# Patient Record
Sex: FEMALE | URBAN - METROPOLITAN AREA
[De-identification: names, ages, dates, MRNs, and addresses within clinical notes are randomized per-mention and may not be internally consistent; named-entity substitution may affect disease eponyms.]

---

## 2017-05-20 DIAGNOSIS — F41.9 ANXIETY: ICD-10-CM

## 2017-05-20 NOTE — LETTER
5/22/2017 9:49 AM 
 
Ms. Harrison Mandel [address] [id number] Dear Ms. Michael Grey missed you! Please call our office at [phone] and schedule a follow up appointment for your continued care.  
 
 
 
Sincerely, 
 
 
Julia Che MD

## 2017-05-22 RX ORDER — ALPRAZOLAM 0.25 MG/1
TABLET ORAL
Qty: 90 TAB | Refills: 0 | OUTPATIENT
Start: 2017-05-22

## 2017-05-31 ENCOUNTER — OFFICE VISIT (OUTPATIENT)
Dept: INTERNAL MEDICINE CLINIC | Age: 57
End: 2017-05-31

## 2017-05-31 VITALS
BODY MASS INDEX: 34.78 KG/M2 | WEIGHT: 189 LBS | RESPIRATION RATE: 16 BRPM | DIASTOLIC BLOOD PRESSURE: 87 MMHG | TEMPERATURE: 99.2 F | SYSTOLIC BLOOD PRESSURE: 140 MMHG | HEART RATE: 89 BPM | HEIGHT: 62 IN

## 2017-05-31 DIAGNOSIS — F33.41 RECURRENT MAJOR DEPRESSIVE DISORDER, IN PARTIAL REMISSION (HCC): ICD-10-CM

## 2017-05-31 DIAGNOSIS — Z72.0 TOBACCO ABUSE: ICD-10-CM

## 2017-05-31 DIAGNOSIS — Z00.00 GENERAL MEDICAL EXAM: ICD-10-CM

## 2017-05-31 DIAGNOSIS — F41.9 ANXIETY: Primary | ICD-10-CM

## 2017-05-31 DIAGNOSIS — E03.1 CONGENITAL HYPOTHYROIDISM WITHOUT GOITER: ICD-10-CM

## 2017-05-31 RX ORDER — PAROXETINE HYDROCHLORIDE 20 MG/1
20 TABLET, FILM COATED ORAL DAILY
Qty: 30 TAB | Refills: 5 | Status: SHIPPED | OUTPATIENT
Start: 2017-05-31 | End: 2017-11-29 | Stop reason: SDUPTHER

## 2017-05-31 RX ORDER — ALPRAZOLAM 0.25 MG/1
0.25 TABLET ORAL
Qty: 90 TAB | Refills: 2 | Status: SHIPPED | OUTPATIENT
Start: 2017-05-31 | End: 2017-08-24 | Stop reason: SDUPTHER

## 2017-05-31 NOTE — PROGRESS NOTES
Chief Complaint   Patient presents with    Medication Refill    Labs     Seen once for anxiety ongoing and continuous    Patient Active Problem List    Diagnosis    Colonoscopy refused    Tobacco abuse    Thyroid disease    Depression    Anxiety     Still with stress  Some depression    Vitals:    05/31/17 1604 05/31/17 1626   BP: 144/85 140/87   Pulse: 89    Resp: 16    Temp: 99.2 °F (37.3 °C)    TempSrc: Oral    Weight: 189 lb (85.7 kg)    Height: 5' 2\" (1.575 m)      Arthur Kearns was seen today for medication refill and labs. Diagnoses and all orders for this visit:    Anxiety  -     ALPRAZolam (XANAX) 0.25 mg tablet; Take 1 Tab by mouth three (3) times daily as needed for Anxiety. Max Daily Amount: 0.75 mg. Congenital hypothyroidism without goiter  -     METABOLIC PANEL, COMPREHENSIVE  -     TSH 3RD GENERATION    Recurrent major depressive disorder, in partial remission (HCC)  -     PARoxetine (PAXIL) 20 mg tablet; Take 1 Tab by mouth daily.     Tobacco abuse    General medical exam  -     CBC WITH AUTOMATED DIFF  -     LIPID PANEL      See Madina  Try higher dose paxil

## 2017-05-31 NOTE — MR AVS SNAPSHOT
Visit Information Date & Time Provider Department Dept. Phone Encounter #  
 5/31/2017  4:00 PM Klever Leiva MD [facility] [phone] [id number] Upcoming Health Maintenance Date Due Hepatitis C Screening 1960 Pneumococcal 19-64 Medium Risk (1 of 1 - PPSV23) 7/30/1978 DTaP/Tdap/Td series (1 - Tdap) 7/30/1980 PAP AKA CERVICAL CYTOLOGY 7/30/1980 BREAST CANCER SCRN MAMMOGRAM 7/30/2009 FOBT Q 1 YEAR AGE 50-75 7/30/2009 INFLUENZA AGE 9 TO ADULT 8/1/2017 Allergies as of 5/31/2017  Review Complete On: 5/31/2017 By: Padilla Hsieh LPN Severity Noted Reaction Type Reactions Codeine  12/30/2016    Nausea Only Pcn [Penicillins]  12/30/2016    Hives Tetracycline  12/30/2016    Nausea Only Current Immunizations  Never Reviewed No immunizations on file. Not reviewed this visit You Were Diagnosed With   
  
 Codes Comments Tobacco abuse    -  Primary ICD-10-CM: Z72.0 ICD-9-CM: 305.1 Anxiety     ICD-10-CM: F41.9 ICD-9-CM: 300.00 Congenital hypothyroidism without goiter     ICD-10-CM: E03.1 ICD-9-CM: 159 Recurrent major depressive disorder, [facility] ([facility]     ICD-10-CM: F33.41 
ICD-9-CM: 296.35 General medical exam     ICD-10-CM: Z00.00 ICD-9-CM: V70.9 Vitals BP Pulse Temp Resp Height(growth percentile) Weight(growth percentile) 140/87 89 99.2 °F (37.3 °C) (Oral) 16 5' 2\" (1.575 m) 189 lb (85.7 kg) BMI OB Status Smoking Status 34.57 kg/m2 Postmenopausal Current Every Day Smoker Vitals History BMI and BSA Data Body Mass Index Body Surface Area 34.57 kg/m 2 1.94 m 2 Preferred Pharmacy Pharmacy Name Phone [address] AT [address] [place] [address]. [phone] Your Updated Medication List  
  
   
This list is accurate as of: 5/31/17  4:32 PM.  Always use your most recent med list.  
  
  
  
  
 ALPRAZolam 0.25 mg tablet Commonly known as:  Lafrances Ruffini Take 1 Tab by mouth three (3) times daily as needed for Anxiety. Max Daily Amount: 0.75 mg.  
  
 levothyroxine 100 mcg tablet Commonly known as:  SYNTHROID TK 1 T PO  D  
  
 PARoxetine 20 mg tablet Commonly known as:  PAXIL Take 1 Tab by mouth daily. Prescriptions Printed Refills ALPRAZolam (XANAX) 0.25 mg tablet 2 Sig: Take 1 Tab by mouth three (3) times daily as needed for Anxiety. Max Daily Amount: 0.75 mg. Class: Print Route: Oral  
  
Prescriptions Sent to Pharmacy Refills PARoxetine (PAXIL) 20 mg tablet 5 Sig: Take 1 Tab by mouth daily. Class: Normal  
 Pharmacy: [facility] [address] [place] [address] AT [address] [facility].  #: [phone] Route: Oral  
  
We Performed the Following CBC WITH AUTOMATED DIFF [87787 CPT(R)] LIPID PANEL [50869 CPT(R)] METABOLIC PANEL, COMPREHENSIVE [86135 CPT(R)] TSH 3RD GENERATION [59634 CPT(R)] Introducing hospitals & HEALTH SERVICES! [facility] introduces Inkling patient portal. Now you can access parts of your medical record, email your doctor's office, and request medication refills online. 1. In your internet browser, go to [email or web address] 2. Click on the First Time User? Click Here link in the Sign In box. You will see the New Member Sign Up page. 3. Enter your Inkling Access Code exactly as it appears below. You will not need to use this code after youve completed the sign-up process. If you do not sign up before the expiration date, you must request a new code. · Inkling Access Code: [email or web address] Expires: 8/29/2017  4:32 PM 
 
4. Enter the last four digits of your Social Security Number (xxxx) and Date of Birth (mm/dd/yyyy) as indicated and click Submit. You will be taken to the next sign-up page. 5. Create a Reduce Data ID. This will be your Reduce Data login ID and cannot be changed, so think of one that is secure and easy to remember. 6. Create a Reduce Data password. You can change your password at any time. 7. Enter your Password Reset Question and Answer. This can be used at a later time if you forget your password. 8. Enter your e-mail address. You will receive e-mail notification when new information is available in [address] 9. Click Sign Up. You can now view and download portions of your medical record. 10. Click the Download Summary menu link to download a portable copy of your medical information. If you have questions, please visit the Frequently Asked Questions section of the Reduce Data website. Remember, Reduce Data is NOT to be used for urgent needs. For medical emergencies, dial 911. Now available from your iPhone and Android! Please provide this summary of care documentation to your next provider. Your primary care clinician is listed as [phone] [address] [place] [address] [address]. If you have any questions after today's visit, please call [phone].

## 2017-07-26 DIAGNOSIS — E03.1 CONGENITAL HYPOTHYROIDISM WITHOUT GOITER: ICD-10-CM

## 2017-07-26 RX ORDER — LEVOTHYROXINE SODIUM 100 UG/1
TABLET ORAL
Qty: 30 TAB | Refills: 0 | Status: SHIPPED | OUTPATIENT
Start: 2017-07-26 | End: 2017-08-24 | Stop reason: SDUPTHER

## 2017-07-26 NOTE — LETTER
7/26/2017 8:19 AM 
 
Ms. Mennie Epley [address] [address] Ms. Edna Green, Your script has been sent for 30 days, you will need to get your labs done prior to anymore refills for your thyroid. Please find attached your labs that need to be done. Thank you, Jazmine Barth :) Sincerely, 
 
 
Dayanara Owens MD

## 2017-07-26 NOTE — TELEPHONE ENCOUNTER
Letter sent for patient to make appointment with Dr. Reji Lees after labs are done. Patient needs OV for medication evaluation.

## 2017-08-23 DIAGNOSIS — E03.1 CONGENITAL HYPOTHYROIDISM WITHOUT GOITER: ICD-10-CM

## 2017-08-23 RX ORDER — LEVOTHYROXINE SODIUM 100 UG/1
TABLET ORAL
Qty: 30 TAB | Refills: 0 | OUTPATIENT
Start: 2017-08-23

## 2017-08-23 NOTE — LETTER
8/24/2017 10:09 AM 
 
Ms. Monalisa White [address] [facility] [address] Dear Ms. Candido Ele Menjivar missed you! Please call our office at [phone] and schedule a follow up appointment for your continued care. Refused Medications LEVOTHYROXINE 0.100MG (100MCG) TAB In chart as: levothyroxine (SYNTHROID) 100 mcg tablet TAKE 1 TABLET BY MOUTH EVERY DAY Disp: 30 Tab Refills: 0 Class: Normal Start: 8/23/2017 For: Congenital hypothyroidism without goiter Refused by: Esperanza Toscano MD 
Refusal reason: Appt required, Sincerely, 
 
 
Esperanza Toscano MD

## 2017-08-24 ENCOUNTER — OFFICE VISIT (OUTPATIENT)
Dept: INTERNAL MEDICINE CLINIC | Age: 57
End: 2017-08-24

## 2017-08-24 VITALS
HEIGHT: 62 IN | HEART RATE: 85 BPM | RESPIRATION RATE: 18 BRPM | WEIGHT: 192 LBS | DIASTOLIC BLOOD PRESSURE: 75 MMHG | TEMPERATURE: 98.1 F | SYSTOLIC BLOOD PRESSURE: 114 MMHG | BODY MASS INDEX: 35.33 KG/M2

## 2017-08-24 DIAGNOSIS — F41.9 ANXIETY: ICD-10-CM

## 2017-08-24 DIAGNOSIS — R01.1 CARDIAC MURMUR: ICD-10-CM

## 2017-08-24 DIAGNOSIS — E03.1 CONGENITAL HYPOTHYROIDISM WITHOUT GOITER: Primary | ICD-10-CM

## 2017-08-24 DIAGNOSIS — F32.89 OTHER DEPRESSION: ICD-10-CM

## 2017-08-24 DIAGNOSIS — Z12.31 ENCOUNTER FOR SCREENING MAMMOGRAM FOR BREAST CANCER: ICD-10-CM

## 2017-08-24 DIAGNOSIS — E03.1 CONGENITAL HYPOTHYROIDISM WITHOUT GOITER: ICD-10-CM

## 2017-08-24 DIAGNOSIS — Z72.0 TOBACCO ABUSE: ICD-10-CM

## 2017-08-24 DIAGNOSIS — Z13.220 SCREENING CHOLESTEROL LEVEL: ICD-10-CM

## 2017-08-24 RX ORDER — LEVOTHYROXINE SODIUM 100 UG/1
TABLET ORAL
Qty: 30 TAB | Refills: 0 | Status: SHIPPED | OUTPATIENT
Start: 2017-08-24 | End: 2017-08-24 | Stop reason: SDUPTHER

## 2017-08-24 RX ORDER — ALPRAZOLAM 0.25 MG/1
0.25 TABLET ORAL
Qty: 90 TAB | Refills: 0 | Status: SHIPPED | OUTPATIENT
Start: 2017-08-24 | End: 2017-08-24 | Stop reason: SDUPTHER

## 2017-08-24 RX ORDER — VARENICLINE TARTRATE 25 MG
KIT ORAL
Qty: 1 DOSE PACK | Refills: 0 | Status: SHIPPED | OUTPATIENT
Start: 2017-08-24 | End: 2018-06-19 | Stop reason: ALTCHOICE

## 2017-08-24 RX ORDER — VARENICLINE TARTRATE 1 MG/1
1 TABLET, FILM COATED ORAL 2 TIMES DAILY
Qty: 56 TAB | Refills: 1 | Status: SHIPPED | OUTPATIENT
Start: 2017-08-24 | End: 2017-11-22

## 2017-08-24 NOTE — MR AVS SNAPSHOT
Visit Information Date & Time Provider Department Dept. Phone Encounter #  
 8/24/2017 10:45 AM Remedios Marvin MD [facility] [phone] [id number] Upcoming Health Maintenance Date Due Hepatitis C Screening 1960 Pneumococcal 19-64 Medium Risk (1 of 1 - PPSV23) 7/30/1978 DTaP/Tdap/Td series (1 - Tdap) 7/30/1980 PAP AKA CERVICAL CYTOLOGY 7/30/1980 BREAST CANCER SCRN MAMMOGRAM 7/30/2009 FOBT Q 1 YEAR AGE 50-75 7/30/2009 INFLUENZA AGE 9 TO ADULT 8/1/2017 Allergies as of 8/24/2017  Review Complete On: 8/24/2017 By: Aaliyah Menchaca Severity Noted Reaction Type Reactions Codeine  12/30/2016    Nausea Only Pcn [Penicillins]  12/30/2016    Hives Tetracycline  12/30/2016    Nausea Only Current Immunizations  Never Reviewed No immunizations on file. Not reviewed this visit You Were Diagnosed With   
  
 Codes Comments Congenital hypothyroidism without goiter    -  Primary ICD-10-CM: E03.1 ICD-9-CM: 507 Other depression     ICD-10-CM: F32.89 ICD-9-CM: 167 Anxiety     ICD-10-CM: F41.9 ICD-9-CM: 300.00 Screening cholesterol level     ICD-10-CM: Y62.299 ICD-9-CM: V77.91 Encounter for screening mammogram for breast cancer     ICD-10-CM: Z12.31 
ICD-9-CM: V76.12 Tobacco abuse     ICD-10-CM: Z72.0 ICD-9-CM: 305.1 Cardiac murmur     ICD-10-CM: R01.1 ICD-9-CM: 502. 2 Vitals BP Pulse Temp Resp Height(growth percentile) Weight(growth percentile) 114/75 (BP 1 Location: Left arm, BP Patient Position: At rest) 85 98.1 °F (36.7 °C) (Oral) 18 5' 2\" (1.575 m) 192 lb (87.1 kg) BMI OB Status Smoking Status 35.12 kg/m2 Postmenopausal Current Every Day Smoker BMI and BSA Data Body Mass Index Body Surface Area  
 35.12 kg/m 2 1.95 m 2 Preferred Pharmacy Pharmacy Name Phone [address] AT [address] [phone] Your Updated Medication List  
  
   
This list is accurate as of: 8/24/17 11:06 AM.  Always use your most recent med list.  
  
  
  
  
 ALPRAZolam 0.25 mg tablet Commonly known as:  Jennifer Ream Take 1 Tab by mouth three (3) times daily as needed for Anxiety. Max Daily Amount: 0.75 mg.  
  
 levothyroxine 100 mcg tablet Commonly known as:  SYNTHROID  
TAKE 1 TABLET BY MOUTH EVERY DAY PARoxetine 20 mg tablet Commonly known as:  PAXIL Take 1 Tab by mouth daily. * varenicline 1 mg tablet Commonly known as:  Barbara Kidney Take 1 Tab by mouth two (2) times a day for 90 days. * varenicline 0.5 mg (11)- 1 mg (42) Dspk Commonly known as:  CHANTIX STARTER MARTHA  
UAD * Notice: This list has 2 medication(s) that are the same as other medications prescribed for you. Read the directions carefully, and ask your doctor or other care provider to review them with you. Prescriptions Printed Refills ALPRAZolam (XANAX) 0.25 mg tablet 0 Sig: Take 1 Tab by mouth three (3) times daily as needed for Anxiety. Max Daily Amount: 0.75 mg. Class: Print Route: Oral  
 varenicline (CHANTIX) 1 mg tablet 1 Sig: Take 1 Tab by mouth two (2) times a day for 90 days. Class: Print Route: Oral  
  
Prescriptions Sent to Pharmacy Refills  
 levothyroxine (SYNTHROID) 100 mcg tablet 0 Sig: TAKE 1 TABLET BY MOUTH EVERY DAY Class: Normal  
 [facility] [address] [place] [address] AT [address] [place] [address]. Ph #: [phone]  
 varenicline (CHANTIX STARTER MARTHA) 0.5 mg (11)- 1 mg (42) DsPk 0 Sig: UAD Class: Normal  
 Pharmacy: [facility] [address] [place] [address] AT [address] [place] [address]. Ph #: [phone] We Performed the Following CBC WITH AUTOMATED DIFF [11687 CPT(R)] LIPID PANEL [61611 CPT(R)] METABOLIC PANEL, COMPREHENSIVE [77353 CPT(R)] To-Do List   
 08/24/2017 ECHO:  2D ECHO COMPLETE ADULT (TTE) W OR WO CONTR   
  
 08/24/2017 Imaging:  [facility] MAMMO BI SCREENING INCL CAD   
  
 08/24/2017 Lab:  TSH 3RD GENERATION Introducing [facility] & HEALTH SERVICES! Yancy Bermeo introduces Mobile2Me patient portal. Now you can access parts of your medical record, email your doctor's office, and request medication refills online. 1. In your internet browser, go to [email or web address] 2. Click on the First Time User? Click Here link in the Sign In box. You will see the New Member Sign Up page. 3. Enter your Mobile2Me Access Code exactly as it appears below. You will not need to use this code after youve completed the sign-up process. If you do not sign up before the expiration date, you must request a new code. · Mobile2Me Access Code: [email or web address] Expires: 8/29/2017  4:32 PM 
 
4. Enter the last four digits of your Social Security Number (xxxx) and Date of Birth (mm/dd/yyyy) as indicated and click Submit. You will be taken to the next sign-up page. 5. Create a Mobile2Me ID. This will be your Mobile2Me login ID and cannot be changed, so think of one that is secure and easy to remember. 6. Create a Mobile2Me password. You can change your password at any time. 7. Enter your Password Reset Question and Answer. This can be used at a later time if you forget your password. 8. Enter your e-mail address. You will receive e-mail notification when new information is available in [address] 9. Click Sign Up. You can now view and download portions of your medical record. 10. Click the Download Summary menu link to download a portable copy of your medical information. If you have questions, please visit the Frequently Asked Questions section of the Mobile2Me website. Remember, Mobile2Me is NOT to be used for urgent needs. For medical emergencies, dial 911. Now available from your iPhone and Android! Please provide this summary of care documentation to your next provider. Your primary care clinician is listed as [phone] [address] [place] [address] [address]. If you have any questions after today's visit, please call [phone].

## 2017-08-24 NOTE — TELEPHONE ENCOUNTER
attempted to call patient to advised that an office visit is needed for a medication refill. All numbers on file were disconnected.  Letter sent

## 2017-08-24 NOTE — PROGRESS NOTES
HISTORY OF PRESENT ILLNESS  Rosalva Garrido is a 61 y.o. female. HPI  Here for anxiety. She is controlled on her meds. Her depression is fine on increased paxil . She is due for labs to assess her thyroid. She has a long standing murmur but no recent evaluation. She is due for a mammogram. She has a cold for two days and son is sick. She is a smoker. Past Medical History:   Diagnosis Date    Anxiety     Depression     Thyroid disease      Current Outpatient Prescriptions   Medication Sig    levothyroxine (SYNTHROID) 100 mcg tablet TAKE 1 TABLET BY MOUTH EVERY DAY    ALPRAZolam (XANAX) 0.25 mg tablet Take 1 Tab by mouth three (3) times daily as needed for Anxiety. Max Daily Amount: 0.75 mg.  varenicline (CHANTIX) 1 mg tablet Take 1 Tab by mouth two (2) times a day for 90 days.  varenicline (CHANTIX STARTER MARTHA) 0.5 mg (11)- 1 mg (42) DsPk UAD    PARoxetine (PAXIL) 20 mg tablet Take 1 Tab by mouth daily. No current facility-administered medications for this visit. Review of Systems   All other systems reviewed and are negative. Visit Vitals    /75 (BP 1 Location: Left arm, BP Patient Position: At rest)    Pulse 85    Temp 98.1 °F (36.7 °C) (Oral)    Resp 18    Ht 5' 2\" (1.575 m)    Wt 192 lb (87.1 kg)    BMI 35.12 kg/m2       Physical Exam   Constitutional: She appears well-developed and well-nourished. Cardiovascular: Normal rate and regular rhythm. Murmur heard. Pulmonary/Chest: Effort normal and breath sounds normal. No respiratory distress. She has no wheezes. She has no rales. Psychiatric: She has a normal mood and affect. Her behavior is normal. Judgment and thought content normal.   Nursing note and vitals reviewed. ASSESSMENT and PLAN  Diagnoses and all orders for this visit:    1. Congenital hypothyroidism without goiter  -     TSH 3RD GENERATION;  Future  -     levothyroxine (SYNTHROID) 100 mcg tablet; TAKE 1 TABLET BY MOUTH EVERY DAY  The current medical regimen is effective;  continue present plan and medications. 2. Other depression  The current medical regimen is effective;  continue present plan and medications. 3. Anxiety  -     ALPRAZolam (XANAX) 0.25 mg tablet; Take 1 Tab by mouth three (3) times daily as needed for Anxiety. Max Daily Amount: 0.75 mg. The current medical regimen is effective;  continue present plan and medications. 4. Screening cholesterol level  -     METABOLIC PANEL, COMPREHENSIVE  -     LIPID PANEL  -     CBC WITH AUTOMATED DIFF    5. Encounter for screening mammogram for breast cancer  -     [facility] MAMMO BI SCREENING INCL CAD; Future    6. Tobacco abuse  -     varenicline (CHANTIX) 1 mg tablet; Take 1 Tab by mouth two (2) times a day for 90 days. -     varenicline (CHANTIX STARTER MARTHA) 0.5 mg (11)- 1 mg (42) DsPk; UAD    7.  Cardiac murmur  -     2D ECHO COMPLETE ADULT (TTE) W OR WO CONTR; Future    Reviewed plan of care with the patient who has provided input and agrees with the goals

## 2017-08-24 NOTE — PROGRESS NOTES
1. Have you been to the ER, urgent care clinic since your last visit? Hospitalized since your last visit?no  2. Have you seen or consulted any other health care providers outside of the [address] [place] [address] since your last visit? Include any pap smears or colon screening.  No  Chief Complaint   Patient presents with    Medication Refill    Sore Throat     Not fasting

## 2017-08-25 LAB
ALBUMIN SERPL-MCNC: 4 G/DL (ref 3.5–5.5)
ALBUMIN/GLOB SERPL: 1.6 {RATIO} (ref 1.2–2.2)
ALP SERPL-CCNC: 48 IU/L (ref 39–117)
ALT SERPL-CCNC: 12 IU/L (ref 0–32)
AST SERPL-CCNC: 19 IU/L (ref 0–40)
BASOPHILS # BLD AUTO: 0 X10E3/UL (ref 0–0.2)
BASOPHILS NFR BLD AUTO: 0 %
BILIRUB SERPL-MCNC: <0.2 MG/DL (ref 0–1.2)
BUN SERPL-MCNC: 14 MG/DL (ref 6–24)
BUN/CREAT SERPL: 24 (ref 9–23)
CALCIUM SERPL-MCNC: 9.1 MG/DL (ref 8.7–10.2)
CHLORIDE SERPL-SCNC: 100 MMOL/L (ref 96–106)
CHOLEST SERPL-MCNC: 216 MG/DL (ref 100–199)
CO2 SERPL-SCNC: 25 MMOL/L (ref 18–29)
CREAT SERPL-MCNC: 0.58 MG/DL (ref 0.57–1)
EOSINOPHIL # BLD AUTO: 0.1 X10E3/UL (ref 0–0.4)
EOSINOPHIL NFR BLD AUTO: 2 %
ERYTHROCYTE [DISTWIDTH] IN BLOOD BY AUTOMATED COUNT: 14.9 % (ref 12.3–15.4)
GLOBULIN SER CALC-MCNC: 2.5 G/DL (ref 1.5–4.5)
GLUCOSE SERPL-MCNC: 82 MG/DL (ref 65–99)
HCT VFR BLD AUTO: 39.8 % (ref 34–46.6)
HDLC SERPL-MCNC: 48 MG/DL
HGB BLD-MCNC: 13.2 G/DL (ref 11.1–15.9)
IMM GRANULOCYTES # BLD: 0 X10E3/UL (ref 0–0.1)
IMM GRANULOCYTES NFR BLD: 0 %
INTERPRETATION, 910389: NORMAL
LDLC SERPL CALC-MCNC: 149 MG/DL (ref 0–99)
LYMPHOCYTES # BLD AUTO: 2.1 X10E3/UL (ref 0.7–3.1)
LYMPHOCYTES NFR BLD AUTO: 29 %
MCH RBC QN AUTO: 32.3 PG (ref 26.6–33)
MCHC RBC AUTO-ENTMCNC: 33.2 G/DL (ref 31.5–35.7)
MCV RBC AUTO: 97 FL (ref 79–97)
MONOCYTES # BLD AUTO: 0.3 X10E3/UL (ref 0.1–0.9)
MONOCYTES NFR BLD AUTO: 4 %
NEUTROPHILS # BLD AUTO: 4.7 X10E3/UL (ref 1.4–7)
NEUTROPHILS NFR BLD AUTO: 65 %
PLATELET # BLD AUTO: 279 X10E3/UL (ref 150–379)
POTASSIUM SERPL-SCNC: 4.8 MMOL/L (ref 3.5–5.2)
PROT SERPL-MCNC: 6.5 G/DL (ref 6–8.5)
RBC # BLD AUTO: 4.09 X10E6/UL (ref 3.77–5.28)
SODIUM SERPL-SCNC: 141 MMOL/L (ref 134–144)
TRIGL SERPL-MCNC: 97 MG/DL (ref 0–149)
TSH SERPL DL<=0.005 MIU/L-ACNC: 3.59 UIU/ML (ref 0.45–4.5)
VLDLC SERPL CALC-MCNC: 19 MG/DL (ref 5–40)
WBC # BLD AUTO: 7.2 X10E3/UL (ref 3.4–10.8)

## 2017-08-28 ENCOUNTER — TELEPHONE (OUTPATIENT)
Dept: INTERNAL MEDICINE CLINIC | Age: 57
End: 2017-08-28

## 2017-08-28 DIAGNOSIS — E03.1 CONGENITAL HYPOTHYROIDISM WITHOUT GOITER: ICD-10-CM

## 2017-08-28 DIAGNOSIS — E03.1 CONGENITAL HYPOTHYROIDISM WITHOUT GOITER: Primary | ICD-10-CM

## 2017-08-28 RX ORDER — LEVOTHYROXINE SODIUM 112 UG/1
112 TABLET ORAL
Qty: 90 TAB | Refills: 1 | Status: SHIPPED | OUTPATIENT
Start: 2017-08-28 | End: 2018-02-28 | Stop reason: SDUPTHER

## 2017-08-28 NOTE — TELEPHONE ENCOUNTER
Attempted to call patient line was busy. To advise per Dr. Bobby Fuchs Thyroid is slightly low.  Increase to 112 and retest in 3 months

## 2017-08-28 NOTE — LETTER
8/29/2017 3:34 PM 
 
Ms. Sanjuanita Pedroza [address] [address] Thyroid level is slightly low so increase levothyroxine to 112mcg and retest in three months/ 
 
 
    
  levothyroxine (SYNTHROID) 112 mcg tablet Ordered On: 08/28/2017  
   
  
   
 
 
 
 
Sincerely, 
 
 
Elihu Holter, MD

## 2017-11-13 ENCOUNTER — TELEPHONE (OUTPATIENT)
Dept: INTERNAL MEDICINE CLINIC | Age: 57
End: 2017-11-13

## 2017-11-13 DIAGNOSIS — F41.9 ANXIETY: ICD-10-CM

## 2017-11-13 RX ORDER — ALPRAZOLAM 0.25 MG/1
TABLET ORAL
Qty: 90 TAB | Refills: 2 | Status: SHIPPED | OUTPATIENT
Start: 2017-11-13 | End: 2018-02-27 | Stop reason: SDUPTHER

## 2017-11-28 DIAGNOSIS — F33.41 RECURRENT MAJOR DEPRESSIVE DISORDER, IN PARTIAL REMISSION (HCC): ICD-10-CM

## 2017-11-28 RX ORDER — PAROXETINE HYDROCHLORIDE 20 MG/1
TABLET, FILM COATED ORAL
Qty: 30 TAB | Refills: 0 | OUTPATIENT
Start: 2017-11-28

## 2017-11-29 RX ORDER — PAROXETINE HYDROCHLORIDE 20 MG/1
20 TABLET, FILM COATED ORAL DAILY
Qty: 30 TAB | Refills: 5 | Status: SHIPPED | OUTPATIENT
Start: 2017-11-29 | End: 2018-05-24 | Stop reason: SDUPTHER

## 2018-02-27 ENCOUNTER — OFFICE VISIT (OUTPATIENT)
Dept: INTERNAL MEDICINE CLINIC | Age: 58
End: 2018-02-27

## 2018-02-27 VITALS
RESPIRATION RATE: 20 BRPM | WEIGHT: 197 LBS | OXYGEN SATURATION: 97 % | HEIGHT: 62 IN | BODY MASS INDEX: 36.25 KG/M2 | TEMPERATURE: 98 F | DIASTOLIC BLOOD PRESSURE: 73 MMHG | SYSTOLIC BLOOD PRESSURE: 115 MMHG | HEART RATE: 93 BPM

## 2018-02-27 DIAGNOSIS — E03.1 CONGENITAL HYPOTHYROIDISM WITHOUT GOITER: Primary | ICD-10-CM

## 2018-02-27 DIAGNOSIS — F41.9 ANXIETY: ICD-10-CM

## 2018-02-27 DIAGNOSIS — Z72.0 TOBACCO ABUSE: ICD-10-CM

## 2018-02-27 RX ORDER — ALPRAZOLAM 0.25 MG/1
TABLET ORAL
Qty: 90 TAB | Refills: 0 | Status: SHIPPED | OUTPATIENT
Start: 2018-02-27 | End: 2018-03-27 | Stop reason: SDUPTHER

## 2018-02-27 NOTE — PROGRESS NOTES
HISTORY OF PRESENT ILLNESS  Riki Cockayne is a 61 y.o. female. HPI  Patient presents to the office to have her lab work done and to get refills of her xanax. She reports she is feeling a little more stressed today. She had to take her daughter to [facility] yesterdays and now she is taking care of her grand kids. She reports she is still smoking 1 pack daily. Review of Systems   Psychiatric/Behavioral:        Stress at home. Blood pressure 115/73, pulse 93, temperature 98 °F (36.7 °C), temperature source Oral, resp. rate 20, height 5' 2\" (1.575 m), weight 197 lb (89.4 kg), SpO2 97 %. Physical Exam   Constitutional: She appears well-developed and well-nourished. No distress. Cardiovascular: Normal rate and regular rhythm. Murmur heard. Pulmonary/Chest: Effort normal and breath sounds normal.   Psychiatric: She has a normal mood and affect. Her behavior is normal. Judgment and thought content normal.       ASSESSMENT and PLAN  Diagnoses and all orders for this visit:    1. Congenital hypothyroidism without goiter  -     TSH 3RD GENERATION    2. Anxiety  -     ALPRAZolam (XANAX) 0.25 mg tablet; TAKE 1 TABLET BY MOUTH THREE TIMES DAILY AS NEEDED FOR ANXIETY    3. Tobacco abuse    I will contact the patient with her results once back and then refill her medication. All this discussed with the patient and she understands and agrees.

## 2018-02-28 ENCOUNTER — TELEPHONE (OUTPATIENT)
Dept: INTERNAL MEDICINE CLINIC | Age: 58
End: 2018-02-28

## 2018-02-28 LAB — TSH SERPL DL<=0.005 MIU/L-ACNC: 2.64 UIU/ML (ref 0.45–4.5)

## 2018-02-28 NOTE — PROGRESS NOTES
Please let the patient know her TSH is normal. I will be sending her refills for her thyroid medication now.  thanks

## 2018-03-02 NOTE — PROGRESS NOTES
Writer called patient to inform of lab results and information per Zahra Her, patient verbalized understanding.

## 2018-03-27 DIAGNOSIS — F41.9 ANXIETY: ICD-10-CM

## 2018-03-27 RX ORDER — ALPRAZOLAM 0.25 MG/1
TABLET ORAL
Qty: 90 TAB | Refills: 0 | Status: SHIPPED | OUTPATIENT
Start: 2018-03-27 | End: 2018-04-24 | Stop reason: SDUPTHER

## 2018-03-29 RX ORDER — LEVOTHYROXINE SODIUM 112 UG/1
112 TABLET ORAL
Qty: 90 TAB | Refills: 1 | Status: SHIPPED | OUTPATIENT
Start: 2018-03-29 | End: 2018-10-10 | Stop reason: SDUPTHER

## 2018-04-26 ENCOUNTER — TELEPHONE (OUTPATIENT)
Dept: INTERNAL MEDICINE CLINIC | Age: 58
End: 2018-04-26

## 2018-04-26 NOTE — TELEPHONE ENCOUNTER
request from the pharmacy for xanax. It has been filled. I have changed the prescription from TID to BID. 60 pills were sent.

## 2018-04-26 NOTE — TELEPHONE ENCOUNTER
Pt requested a refill on Rx(Alprazolam 0.25 mg) called to [facility] on file)  Best contact number [phone].              From answering service

## 2018-05-24 DIAGNOSIS — F41.9 ANXIETY: ICD-10-CM

## 2018-05-24 DIAGNOSIS — F33.41 RECURRENT MAJOR DEPRESSIVE DISORDER, IN PARTIAL REMISSION (HCC): ICD-10-CM

## 2018-05-24 RX ORDER — ALPRAZOLAM 0.25 MG/1
0.25 TABLET ORAL 2 TIMES DAILY
Qty: 60 TAB | Refills: 0 | Status: SHIPPED | OUTPATIENT
Start: 2018-05-24 | End: 2018-06-19 | Stop reason: DRUGHIGH

## 2018-05-25 RX ORDER — PAROXETINE HYDROCHLORIDE 20 MG/1
TABLET, FILM COATED ORAL
Qty: 30 TAB | Refills: 0 | Status: SHIPPED | OUTPATIENT
Start: 2018-05-25 | End: 2018-06-19 | Stop reason: DRUGHIGH

## 2018-06-19 ENCOUNTER — OFFICE VISIT (OUTPATIENT)
Dept: INTERNAL MEDICINE CLINIC | Age: 58
End: 2018-06-19

## 2018-06-19 VITALS
RESPIRATION RATE: 20 BRPM | SYSTOLIC BLOOD PRESSURE: 137 MMHG | BODY MASS INDEX: 35.33 KG/M2 | DIASTOLIC BLOOD PRESSURE: 80 MMHG | HEART RATE: 91 BPM | WEIGHT: 192 LBS | TEMPERATURE: 98.1 F | HEIGHT: 62 IN

## 2018-06-19 DIAGNOSIS — F32.A DEPRESSION, UNSPECIFIED DEPRESSION TYPE: ICD-10-CM

## 2018-06-19 DIAGNOSIS — F41.9 ANXIETY: ICD-10-CM

## 2018-06-19 DIAGNOSIS — J30.89 ENVIRONMENTAL AND SEASONAL ALLERGIES: Primary | ICD-10-CM

## 2018-06-19 RX ORDER — ALPRAZOLAM 0.25 MG/1
0.25 TABLET ORAL
Qty: 90 TAB | Refills: 0 | Status: SHIPPED | OUTPATIENT
Start: 2018-06-19 | End: 2018-07-19 | Stop reason: SDUPTHER

## 2018-06-19 RX ORDER — PAROXETINE HYDROCHLORIDE 40 MG/1
40 TABLET, FILM COATED ORAL DAILY
Qty: 30 TAB | Refills: 5 | Status: SHIPPED | OUTPATIENT
Start: 2018-06-19 | End: 2019-01-03 | Stop reason: SDUPTHER

## 2018-06-19 RX ORDER — FLUTICASONE PROPIONATE 50 MCG
2 SPRAY, SUSPENSION (ML) NASAL DAILY
Qty: 1 BOTTLE | Refills: 2 | Status: SHIPPED | OUTPATIENT
Start: 2018-06-19 | End: 2019-02-11

## 2018-06-19 NOTE — PROGRESS NOTES
HISTORY OF PRESENT ILLNESS  Pati Mas is a 61 y.o. female. HPI  Patient presents to the office for evaluation of her anxiety and seasonal allergies. She states she is under a lot of stress right now. She will be getting custody of her grandchildren. Her daughter recently was released from long term and her son will be going to long term soon. She has been taking her xanax three times a day some days due to increase in anxiety at work. She has been having allergy symptoms as well. Running nose with clear mucous. Scratchy throat and sneezing. She has not been taking anything for her symptoms. Review of Systems   Constitutional: Negative for chills and fever. HENT: Positive for congestion and sore throat. Psychiatric/Behavioral: Negative for depression. The patient is nervous/anxious. Blood pressure 137/80, pulse 91, temperature 98.1 °F (36.7 °C), temperature source Oral, resp. rate 20, height 5' 2\" (1.575 m), weight 192 lb (87.1 kg). Physical Exam   HENT:   TM's not visible due to cerumen  Throat is clear. Cardiovascular: Normal rate and regular rhythm. No murmur heard. Pulmonary/Chest: Effort normal and breath sounds normal.   Psychiatric: She has a normal mood and affect. Her behavior is normal. Judgment and thought content normal.   Patient states she does have a support system in place and that she has a  on behalf of the children. ASSESSMENT and PLAN  Diagnoses and all orders for this visit:    1. Environmental and seasonal allergies  -     fluticasone (FLONASE) 50 mcg/actuation nasal spray; 2 Sprays by Both Nostrils route daily. 2. Anxiety  -     ALPRAZolam (XANAX) 0.25 mg tablet; Take 1 Tab by mouth three (3) times daily as needed for Anxiety. Max Daily Amount: 0.75 mg.    3. Depression, unspecified depression type  -     PARoxetine (PAXIL) 40 mg tablet; Take 1 Tab by mouth daily. patient to start flonase as prescribed.  We talked about adding one of the otc antihistamines as well. We talked about taking her medication as prescribed. She understands the xanax is not intended for long term today I have increased her paxil to 40 mg from 20 mg. She is to take the xanax TID prn. She understands if this does not work I would like for her to see a mental health professional. All this was discussed with the patient and she understands and agrees.

## 2018-06-19 NOTE — MR AVS SNAPSHOT
[address] [place] [address] [address] [place] [address] 
[phone] Patient: Rosalva Garrido MRN: [id number] CKU:[id number] Visit Information Date & Time Provider Department Dept. Phone Encounter #  
 6/19/2018  8:30 AM Angelika Chirinos PA-C [facility] [phone] [id number] Upcoming Health Maintenance Date Due Hepatitis C Screening 1960 Pneumococcal 19-64 Medium Risk (1 of 1 - PPSV23) 7/30/1978 DTaP/Tdap/Td series (1 - Tdap) 7/30/1980 PAP AKA CERVICAL CYTOLOGY 7/30/1980 BREAST CANCER SCRN MAMMOGRAM 7/30/2009 FOBT Q 1 YEAR AGE 50-75 7/30/2009 Influenza Age 5 to Adult 8/1/2018 Allergies as of 6/19/2018  Review Complete On: 6/19/2018 By: Angelika Chirinos PA-C Severity Noted Reaction Type Reactions Codeine  12/30/2016    Nausea Only Pcn [Penicillins]  12/30/2016    Hives Tetracycline  12/30/2016    Nausea Only Current Immunizations  Never Reviewed No immunizations on file. Not reviewed this visit You Were Diagnosed With   
  
 Codes Comments Environmental and seasonal allergies    -  Primary ICD-10-CM: J30.89 ICD-9-CM: 477.8 Anxiety     ICD-10-CM: F41.9 ICD-9-CM: 300.00 Depression, unspecified depression type     ICD-10-CM: F32.9 ICD-9-CM: 851 Vitals BP Pulse Temp Resp Height(growth percentile) Weight(growth percentile) 137/80 91 98.1 °F (36.7 °C) (Oral) 20 5' 2\" (1.575 m) 192 lb (87.1 kg) BMI OB Status Smoking Status 35.12 kg/m2 Postmenopausal Current Every Day Smoker BMI and BSA Data Body Mass Index Body Surface Area  
 35.12 kg/m 2 1.95 m 2 Preferred Pharmacy Pharmacy Name Phone [address]  AT [address] [place] [address]. [phone] Your Updated Medication List  
  
   
 This list is accurate as of 18  8:55 AM.  Always use your most recent med list.  
  
  
  
  
 ALPRAZolam 0.25 mg tablet Commonly known as:  Larrie Sark Take 1 Tab by mouth three (3) times daily as needed for Anxiety. Max Daily Amount: 0.75 mg.  
  
 fluticasone 50 mcg/actuation nasal spray Commonly known as:  Ruthe Para 2 Sprays by Both Nostrils route daily. levothyroxine 112 mcg tablet Commonly known as:  SYNTHROID Take 1 Tab by mouth Daily (before breakfast). PARoxetine 40 mg tablet Commonly known as:  PAXIL Take 1 Tab by mouth daily. Prescriptions Printed Refills ALPRAZolam (XANAX) 0.25 mg tablet 0 Sig: Take 1 Tab by mouth three (3) times daily as needed for Anxiety. Max Daily Amount: 0.75 mg. Class: Print Route: Oral  
  
Prescriptions Sent to Pharmacy Refills PARoxetine (PAXIL) 40 mg tablet 5 Sig: Take 1 Tab by mouth daily. Class: Normal  
 Pharmacy: [facility] [address] [place] [address] AT [address] [place] [address]. Ph #: [phone] Route: Oral  
 fluticasone (FLONASE) 50 mcg/actuation nasal spray 2 Si Sprays by Both Nostrils route daily. Class: Normal  
 Pharmacy: [facility] [address] [place] [address] AT [address] [place] [address]. Ph #: [phone] Route: Both Nostrils Please provide this summary of care documentation to your next provider. Your primary care clinician is listed as [id number] [address] [place] [address] [address]. If you have any questions after today's visit, please call [phone].

## 2018-07-05 DIAGNOSIS — F33.41 RECURRENT MAJOR DEPRESSIVE DISORDER, IN PARTIAL REMISSION (HCC): ICD-10-CM

## 2018-07-05 RX ORDER — PAROXETINE HYDROCHLORIDE 20 MG/1
TABLET, FILM COATED ORAL
Qty: 30 TAB | Refills: 0 | Status: SHIPPED | OUTPATIENT
Start: 2018-07-05 | End: 2018-11-13

## 2018-07-19 DIAGNOSIS — F41.9 ANXIETY: ICD-10-CM

## 2018-07-19 RX ORDER — ALPRAZOLAM 0.25 MG/1
0.25 TABLET ORAL
Qty: 90 TAB | Refills: 0 | Status: SHIPPED | OUTPATIENT
Start: 2018-07-19 | End: 2018-09-10 | Stop reason: SDUPTHER

## 2018-09-10 DIAGNOSIS — F41.9 ANXIETY: ICD-10-CM

## 2018-09-10 RX ORDER — ALPRAZOLAM 0.25 MG/1
0.25 TABLET ORAL
Qty: 90 TAB | Refills: 0 | Status: SHIPPED | OUTPATIENT
Start: 2018-09-10 | End: 2018-10-14 | Stop reason: SDUPTHER

## 2018-09-10 NOTE — TELEPHONE ENCOUNTER
Please ask pt to schedule an appointment to establish care with me if she plans to stay at [facility].

## 2018-10-10 RX ORDER — LEVOTHYROXINE SODIUM 112 UG/1
TABLET ORAL
Qty: 90 TAB | Refills: 0 | Status: SHIPPED | OUTPATIENT
Start: 2018-10-10 | End: 2019-01-08 | Stop reason: SDUPTHER

## 2018-10-10 NOTE — TELEPHONE ENCOUNTER
Left detailed message advising patient per Ana Gibson PA-C that the request for a medication refill has been filled for 90 days will need a follow up appointment for further refill.

## 2018-10-14 DIAGNOSIS — F41.9 ANXIETY: ICD-10-CM

## 2018-10-15 RX ORDER — ALPRAZOLAM 0.25 MG/1
TABLET ORAL
Qty: 90 TAB | Refills: 0 | Status: SHIPPED | OUTPATIENT
Start: 2018-10-15 | End: 2018-11-13 | Stop reason: SDUPTHER

## 2018-10-15 NOTE — TELEPHONE ENCOUNTER
Left patient a detailed message advising patient that a request for a medication refill has been approved for 30 days .

## 2018-11-13 ENCOUNTER — OFFICE VISIT (OUTPATIENT)
Dept: INTERNAL MEDICINE CLINIC | Age: 58
End: 2018-11-13

## 2018-11-13 VITALS
DIASTOLIC BLOOD PRESSURE: 69 MMHG | HEART RATE: 90 BPM | RESPIRATION RATE: 18 BRPM | TEMPERATURE: 98 F | HEIGHT: 62 IN | SYSTOLIC BLOOD PRESSURE: 114 MMHG | BODY MASS INDEX: 35.77 KG/M2 | WEIGHT: 194.4 LBS

## 2018-11-13 DIAGNOSIS — Z12.11 SCREEN FOR COLON CANCER: ICD-10-CM

## 2018-11-13 DIAGNOSIS — Z11.59 ENCOUNTER FOR HEPATITIS C SCREENING TEST FOR LOW RISK PATIENT: ICD-10-CM

## 2018-11-13 DIAGNOSIS — E78.00 HYPERCHOLESTEREMIA: ICD-10-CM

## 2018-11-13 DIAGNOSIS — E03.9 ACQUIRED HYPOTHYROIDISM: ICD-10-CM

## 2018-11-13 DIAGNOSIS — F41.9 ANXIETY: ICD-10-CM

## 2018-11-13 DIAGNOSIS — F33.9 RECURRENT MAJOR DEPRESSION RESISTANT TO TREATMENT (HCC): Primary | ICD-10-CM

## 2018-11-13 PROBLEM — E66.01 SEVERE OBESITY (HCC): Status: ACTIVE | Noted: 2018-11-13

## 2018-11-13 RX ORDER — ALPRAZOLAM 0.25 MG/1
TABLET ORAL
Qty: 90 TAB | Refills: 0 | Status: SHIPPED | OUTPATIENT
Start: 2018-11-13 | End: 2018-12-27 | Stop reason: SDUPTHER

## 2018-11-13 RX ORDER — QUETIAPINE FUMARATE 25 MG/1
25 TABLET, FILM COATED ORAL
Qty: 30 TAB | Refills: 2 | Status: SHIPPED | OUTPATIENT
Start: 2018-11-13 | End: 2019-02-11

## 2018-11-13 NOTE — PROGRESS NOTES
HISTORY OF PRESENT ILLNESS Guanaco Motley is a 60 y.o. female. New patient to me. Former patient of Dr. Latoya Mancia. Chief Complaint Patient presents with  Thyroid Problem  
  follow up  Depression  
  follow up Health Maintenance Due Topic Date Due  
 Hepatitis C Screening  1960  Pneumococcal 19-64 Medium Risk (1 of 1 - PPSV23) 07/30/1978  DTaP/Tdap/Td series (1 - Tdap) 07/30/1980  PAP AKA CERVICAL CYTOLOGY  07/30/1980  Shingrix Vaccine Age 50> (1 of 2) 07/30/2009  BREAST CANCER SCRN MAMMOGRAM  07/30/2009  FOBT Q 1 YEAR AGE 50-75  07/30/2009  Influenza Age 5 to Adult  08/01/2018 Influenza: not UTD - she declines. Declines Mammogram, TDAP, Shingles, Pneumonia vac. HPI Hypothyroidism - Controlled at last check. Lab Results Component Value Date/Time TSH 2.640 02/27/2018 11:08 AM  
 
Depression - taking Paxil 40 mg. Pt notes that she has \"no patience\" and has difficulty with feeling crowded at on this higher dose. Sleeping well. Taking Xanax 3x/day. Raising 2 grandchildren (13 and 7 yo) Working full time for [employer]. MOOD DISORDER QUESTIONNAIRE Has there ever been a period of time when you were not your usual self and... Lynn Ida .. you felt so good or so hyper that other people thought you were not your normal self or you were so hyper that you got into trouble? No 
... You were so irritable that you shouted at people or started fights or arguments? N 
... You felt much more self-confident than usual? Y 
... You got much less sleep than usual and found that you didn't really miss it? Y 
... You were more talkative or spoke much faster than usual? N 
... Thoughts raced through your head or you couldn't slow your mind down? Y 
... You were so easily distracted by things around you that you had trouble concentrating or staying on track? N 
... You had much more energy than usual? N 
...  You were much more active or did many more things than usual? Y 
 ... You were much more social or outgoing than usual? N 
... You were much more interested in sex than usual? N 
... You did things that were unusual for you or that other people might have thought were excessive, foolish, or risky? N 
... Spending money got you or your family in trouble? Anola Allis If YES to more than one of the above, have several of these ever happened during the same period of time? Anola Allis How much of a problem did any of these cause you - like being able to work/having family, money, or legal troubles; getting into arguments or fights? (No Problem/Minor Problem/Moderate Problem/ Serious Problem) Moderate Have any of your blood relatives had manic-depressive illness or bipolar disorder? Y - mother Has a health professional ever told you that you have manic-depressive illness or bipolar disorder? N 
 
*(Screening guidelines for positive screen: 7 out of 13 positive responses in question 1, Positive response in question 2, Moderate/Serious response in question 3) Negative screen for BPD. Review of Systems Respiratory: Negative for shortness of breath. Cardiovascular: Negative for chest pain and palpitations. Neurological: Negative for dizziness, loss of consciousness and weakness. Psychiatric/Behavioral: Negative for depression and substance abuse. The patient is nervous/anxious. The patient does not have insomnia. Physical Exam  
Constitutional: She is oriented to person, place, and time. She appears well-developed and well-nourished. No distress. HENT:  
Head: Normocephalic and atraumatic. Neck: Neck supple. No JVD present. Cardiovascular: Normal rate and regular rhythm. Murmur heard. Pulmonary/Chest: Effort normal and breath sounds normal. No respiratory distress. Musculoskeletal: She exhibits no edema. Neurological: She is alert and oriented to person, place, and time. Skin: Skin is warm and dry. Psychiatric: She has a normal mood and affect. Her behavior is normal. Judgment and thought content normal.  
Nursing note and vitals reviewed. ASSESSMENT and PLAN 
  ICD-10-CM ICD-9-CM 1. Recurrent major depression resistant to treatment ([facility] F33.9 296.30 Add nightly QUEtiapine (SEROQUEL) 25 mg tablet Continue Paxil 40 mg  
2. Anxiety F41.9 300.00 Discussed importance of using this as sparingly as possible. ALPRAZolam (XANAX) 0.25 mg tablet INI 6 weeks, will refer to psych. Pt agrees. 3. Screen for colon cancer Z12.11 V76.51 OCCULT BLOOD IMMUNOASSAY,DIAGNOSTIC 4. Hypercholesteremia E78.00 272.0 LIPID PANEL  
   METABOLIC PANEL, COMPREHENSIVE 5. Acquired hypothyroidism E03.9 244.9 T4, FREE  
   TSH 3RD GENERATION 6.  Encounter for hepatitis C screening test for low risk patient Z11.59 V73.89 HCV AB W/RFLX TO RUTH

## 2018-11-13 NOTE — PROGRESS NOTES
1. Have you been to the ER, urgent care clinic since your last visit? Hospitalized since your last visit? No 
 
2. Have you seen or consulted any other health care providers outside of the [facility] since your last visit? Include any pap smears or colon screening. No  
Chief Complaint Patient presents with  Thyroid Problem  
  follow up  Depression  
  follow up Not fasting

## 2018-12-27 DIAGNOSIS — F41.9 ANXIETY: ICD-10-CM

## 2018-12-27 NOTE — TELEPHONE ENCOUNTER
Patient is requesting refill for \"Rx Alprazolam\" patient is completely out of medication and has an appointment scheduled for  Wednesday, January 02, 2019 08:00 AM (routine). Would like to know if can get enough medication to hold her until the date.      Best contact [phone]      FELA

## 2018-12-28 RX ORDER — ALPRAZOLAM 0.25 MG/1
TABLET ORAL
Qty: 21 TAB | Refills: 0 | Status: SHIPPED | OUTPATIENT
Start: 2018-12-28 | End: 2019-01-14 | Stop reason: ALTCHOICE

## 2019-01-03 DIAGNOSIS — F32.A DEPRESSION, UNSPECIFIED DEPRESSION TYPE: ICD-10-CM

## 2019-01-03 RX ORDER — PAROXETINE HYDROCHLORIDE 40 MG/1
TABLET, FILM COATED ORAL
Qty: 30 TAB | Refills: 0 | Status: SHIPPED | OUTPATIENT
Start: 2019-01-03 | End: 2019-03-13 | Stop reason: SDUPTHER

## 2019-01-04 DIAGNOSIS — F32.A DEPRESSION, UNSPECIFIED DEPRESSION TYPE: ICD-10-CM

## 2019-01-04 RX ORDER — PAROXETINE HYDROCHLORIDE 40 MG/1
TABLET, FILM COATED ORAL
Qty: 30 TAB | Refills: 0 | Status: SHIPPED | OUTPATIENT
Start: 2019-01-04 | End: 2019-02-11

## 2019-01-08 RX ORDER — LEVOTHYROXINE SODIUM 112 UG/1
TABLET ORAL
Qty: 90 TAB | Refills: 0 | Status: SHIPPED | OUTPATIENT
Start: 2019-01-08

## 2019-01-08 NOTE — TELEPHONE ENCOUNTER
Left detailed message advising patient per Maryana Clement PA-C that the request for a medication refill has been approved for 90 days but need labs done for further refills

## 2019-01-14 ENCOUNTER — TELEPHONE (OUTPATIENT)
Dept: INTERNAL MEDICINE CLINIC | Age: 59
End: 2019-01-14

## 2019-01-14 ENCOUNTER — OFFICE VISIT (OUTPATIENT)
Dept: INTERNAL MEDICINE CLINIC | Age: 59
End: 2019-01-14

## 2019-01-14 VITALS
DIASTOLIC BLOOD PRESSURE: 73 MMHG | SYSTOLIC BLOOD PRESSURE: 162 MMHG | BODY MASS INDEX: 36.62 KG/M2 | HEIGHT: 62 IN | WEIGHT: 199 LBS | TEMPERATURE: 97.6 F | RESPIRATION RATE: 20 BRPM | HEART RATE: 91 BPM

## 2019-01-14 DIAGNOSIS — M79.602 LEFT ARM PAIN: Primary | ICD-10-CM

## 2019-01-14 DIAGNOSIS — F32.A DEPRESSION, UNSPECIFIED DEPRESSION TYPE: ICD-10-CM

## 2019-01-14 DIAGNOSIS — M79.605 LEFT LEG PAIN: ICD-10-CM

## 2019-01-14 RX ORDER — ALPRAZOLAM 0.25 MG/1
0.25 TABLET ORAL
Qty: 30 TAB | Refills: 0 | Status: SHIPPED | OUTPATIENT
Start: 2019-01-14 | End: 2019-01-27 | Stop reason: SDUPTHER

## 2019-01-14 NOTE — LETTER
NOTIFICATION RETURN TO WORK / SCHOOL 
 
1/14/2019 1:59 PM 
 
Ms. Daja Miller [facility] [phone] To Whom It May Concern: 
 
Daja Miller is currently under the care of Kyung Dias.. She will return to work/school on: 1/16/2019 If there are questions or concerns please have the patient contact our office.  
 
 
 
Sincerely, 
 
 
Brandon Felipe PA-C

## 2019-01-14 NOTE — PROGRESS NOTES
Patient she doesn't know if she pulled something. Sharp pain, patient took tylenol and worked, but pain comes back, patient wondering if she is sleeping wrong. Hard to sleep, keep waking up and uncomfortable. Patient also not taking BP meds, cannot fill until Wednesday when she gets money. Patient unable to take her Paxil and levothyroxine until Wednesday when she gets money. Patient states she takes care of grandchildren, she has custody.

## 2019-01-14 NOTE — PROGRESS NOTES
HISTORY OF PRESENT ILLNESS  Jeanette Fuentes is a 60 y.o. female. HPI  Patient presents to the office for evaluation of left leg and left arm pain. She also would like to talk about the seroquel. She reports she started with some leg and arm pain about 2 weeks ago. She reports she is not sure if it is because of the way she is sleeping. She likes to sleep on her left side. She works all day at a computer and states lately that has been bothering her as well. She does not recall doing anything that may hurt her arm or leg. She has been wearing soft moccasins lately instead of her  shoes with more support. She reports the seroquel was giving her horrible nightmares. She has not been taking that. Also she has been off her other medications as well due to money. She will start back on these meds on Thursday after she get paid. Review of Systems   Musculoskeletal:        Left leg and left arm pain. Blood pressure 162/73, pulse 91, temperature 97.6 °F (36.4 °C), temperature source Oral, resp. rate 20, height 5' 2\" (1.575 m), weight 199 lb (90.3 kg). Physical Exam   Constitutional: She appears well-developed and well-nourished. No distress. Musculoskeletal: She exhibits tenderness. Tenderness of the triceps muscle of the left arm  ROM of arm is normal.  Tenderness of the left shin area. (anterior tibialis muscle)  No edema. No tenderness of ankle or knee. No calf tenderness. Psychiatric: She has a normal mood and affect. Her behavior is normal. Judgment and thought content normal.       ASSESSMENT and PLAN  Diagnoses and all orders for this visit:    1. Left arm pain    2. Left leg pain    3. Depression, unspecified depression type    I am not sure if pain in arm and leg is related to the way she is sleeping or not. It appears to be muscular. I have asked her to go back wearing shoes with support to see if this will alleviate the pain.  She is to be mindful of her posture at work and to try to stay off her left side when sleeping. She will start back on her regular medications on Thursday once she get paid. I would like for her to hold the seroquel and I will let [facility] know. I would like for her to let me know in a week if the arm and leg is better. All this was discussed with the patient and she understands and agrees.

## 2019-01-27 DIAGNOSIS — F32.A DEPRESSION, UNSPECIFIED DEPRESSION TYPE: ICD-10-CM

## 2019-01-28 RX ORDER — ALPRAZOLAM 0.25 MG/1
TABLET ORAL
Qty: 30 TAB | Refills: 0 | Status: SHIPPED | OUTPATIENT
Start: 2019-01-28 | End: 2019-02-11

## 2019-02-11 ENCOUNTER — OFFICE VISIT (OUTPATIENT)
Dept: INTERNAL MEDICINE CLINIC | Age: 59
End: 2019-02-11

## 2019-02-11 VITALS
HEIGHT: 62 IN | DIASTOLIC BLOOD PRESSURE: 90 MMHG | TEMPERATURE: 97.7 F | WEIGHT: 203.8 LBS | BODY MASS INDEX: 37.5 KG/M2 | HEART RATE: 90 BPM | RESPIRATION RATE: 18 BRPM | SYSTOLIC BLOOD PRESSURE: 162 MMHG

## 2019-02-11 DIAGNOSIS — I10 ESSENTIAL HYPERTENSION: ICD-10-CM

## 2019-02-11 DIAGNOSIS — F33.9 RECURRENT MAJOR DEPRESSION RESISTANT TO TREATMENT (HCC): ICD-10-CM

## 2019-02-11 DIAGNOSIS — R06.09 DOE (DYSPNEA ON EXERTION): ICD-10-CM

## 2019-02-11 DIAGNOSIS — E66.01 SEVERE OBESITY (HCC): Primary | ICD-10-CM

## 2019-02-11 DIAGNOSIS — F41.9 ANXIETY: ICD-10-CM

## 2019-02-11 DIAGNOSIS — M25.512 ACUTE PAIN OF LEFT SHOULDER: ICD-10-CM

## 2019-02-11 DIAGNOSIS — E03.9 ACQUIRED HYPOTHYROIDISM: ICD-10-CM

## 2019-02-11 DIAGNOSIS — R01.1 CARDIAC MURMUR: ICD-10-CM

## 2019-02-11 RX ORDER — LOSARTAN POTASSIUM 50 MG/1
50 TABLET ORAL DAILY
Qty: 30 TAB | Refills: 1 | Status: SHIPPED | OUTPATIENT
Start: 2019-02-11

## 2019-02-11 RX ORDER — METHYLPREDNISOLONE 4 MG/1
TABLET ORAL
Qty: 1 DOSE PACK | Refills: 0 | Status: SHIPPED | OUTPATIENT
Start: 2019-02-11

## 2019-02-11 RX ORDER — ALPRAZOLAM 0.25 MG/1
0.25 TABLET ORAL
Qty: 90 TAB | Refills: 1 | Status: SHIPPED | OUTPATIENT
Start: 2019-02-11

## 2019-02-11 NOTE — PROGRESS NOTES
Fernando Adame is a 60 y.o. female  Chief Complaint   Patient presents with    Leg Pain     for the last 3 weeks     Arm Pain     for the last 3 weeks        Health Maintenance Due   Topic Date Due    Hepatitis C Screening  1960    PAP AKA CERVICAL CYTOLOGY  07/30/1980    FOBT Q 1 YEAR AGE 50-75  07/30/2009     HPI  L Leg and L Arm pain has worsened - Arm pain is severe. Leg pain is mild. Saw [facility] 1/14/19 for same x 2 weeks at that time and was instructed to wear supportive shoes and work on posture mindfulness at work. Did this, as well as tried Epsom salts, Thermacare, Icy Hot, Alleve, Tylenol -  No improveent. Anxiety - taking Xanax 3xday. Unable to tolerate Seroquel due to nightmares. Notes understanding that she now needs to see psychiatry due to frequency of Xanax use. Would like Xanax refill. HTN - new dx. BP Readings from Last 3 Encounters:   02/11/19 172/84   01/14/19 162/73   11/13/18 114/69     BP at home: 156/70s. BP at recheck today not improved. Pt notes weight gain this year  Wt Readings from Last 9 Encounters:   02/11/19 203 lb 12.8 oz (92.4 kg)   01/14/19 199 lb (90.3 kg)   11/13/18 194 lb 6.4 oz (88.2 kg)   06/19/18 192 lb (87.1 kg)   02/27/18 197 lb (89.4 kg)   08/24/17 192 lb (87.1 kg)   05/31/17 189 lb (85.7 kg)   12/30/16 186 lb (84.4 kg)   Planning to change diet/exercise to lose weight. Pt has not yet done labs ordered in November. Review of Systems   Respiratory: Negative for shortness of breath. Cardiovascular: Negative for chest pain and palpitations. Musculoskeletal: Negative for back pain and falls. Neurological: Negative for dizziness, loss of consciousness and weakness. Psychiatric/Behavioral: Negative for depression and substance abuse. The patient is nervous/anxious. Physical Exam   Constitutional: She is oriented to person, place, and time. She appears well-developed and well-nourished. No distress.    HENT:   Head: Normocephalic and atraumatic. Neck: Neck supple. No JVD present. No bruit bilateral carotid arteries. Cardiovascular: Normal rate and regular rhythm. Murmur heard. Pulmonary/Chest: Effort normal and breath sounds normal. No respiratory distress. Musculoskeletal: Normal range of motion. She exhibits no edema. Musculature R thoracic paraspinal more pronounced than L. ?curvature of spine. No spasm noted. Neurological: She is alert and oriented to person, place, and time. Skin: Skin is warm and dry. Psychiatric: She has a normal mood and affect. Her behavior is normal. Judgment and thought content normal.   Nursing note and vitals reviewed. Diagnoses and all orders for this visit:    1. Severe obesity ([facility]  Discussed diet/exercise and options for/importance of losing weight. 2. Recurrent major depression resistant to treatment ([facility]  -     REFERRAL TO PSYCHIATRY    3. Acute pain of left shoulder  -     REFERRAL TO ORTHOPEDIC SURGERY  -     methylPREDNISolone (MEDROL, MARTHA,) 4 mg tablet; Take as directed on dose pack. 4. Anxiety  -     REFERRAL TO PSYCHIATRY  -     ALPRAZolam (XANAX) 0.25 mg tablet; Take 1 Tab by mouth three (3) times daily as needed for Anxiety. Max Daily Amount: 0.75 mg. #90 with 1 RF given. Advised pt that she needs to call to set up appointment with psychiatry today, as this dose is beyond the scope of primary care. 5. Cardiac murmur  -     REFERRAL TO CARDIOLOGY    6. DIAZ (dyspnea on exertion)  -     REFERRAL TO CARDIOLOGY    7. Acquired hypothyroidism  Encouraged pt to do labs that were previously ordered.      8 HTN - start Losartan 50 mg

## 2019-02-11 NOTE — PROGRESS NOTES
1. Have you been to the ER, urgent care clinic since your last visit? Hospitalized since your last visit? No    2. Have you seen or consulted any other health care providers outside of the [address] [place] [address] since your last visit? Include any pap smears or colon screening. No   Chief Complaint   Patient presents with    Leg Pain     for the last 3 weeks     Arm Pain     for the last 3 weeks     Not fasting      Abuse Screening Questionnaire 2/11/2019   Do you ever feel afraid of your partner? N   Are you in a relationship with someone who physically or mentally threatens you? N   Is it safe for you to go home?  Gutierrez Frankel

## 2019-02-14 DIAGNOSIS — F32.A DEPRESSION, UNSPECIFIED DEPRESSION TYPE: ICD-10-CM

## 2019-02-14 RX ORDER — PAROXETINE HYDROCHLORIDE 40 MG/1
TABLET, FILM COATED ORAL
Qty: 30 TAB | Refills: 0 | Status: SHIPPED | OUTPATIENT
Start: 2019-02-14

## 2019-03-13 DIAGNOSIS — F32.A DEPRESSION, UNSPECIFIED DEPRESSION TYPE: ICD-10-CM

## 2019-03-14 RX ORDER — PAROXETINE HYDROCHLORIDE 40 MG/1
TABLET, FILM COATED ORAL
Qty: 30 TAB | Refills: 0 | Status: SHIPPED | OUTPATIENT
Start: 2019-03-14